# Patient Record
Sex: MALE
[De-identification: names, ages, dates, MRNs, and addresses within clinical notes are randomized per-mention and may not be internally consistent; named-entity substitution may affect disease eponyms.]

---

## 2019-09-27 ENCOUNTER — HOSPITAL ENCOUNTER (OUTPATIENT)
Dept: HOSPITAL 92 - BICRAD | Age: 34
Discharge: HOME | End: 2019-09-27
Attending: INTERNAL MEDICINE
Payer: COMMERCIAL

## 2019-09-27 DIAGNOSIS — M53.3: Primary | ICD-10-CM

## 2019-09-27 PROCEDURE — 72220 X-RAY EXAM SACRUM TAILBONE: CPT

## 2019-09-27 NOTE — RAD
3 VIEWS SACRUM AND COCCYX:

 

Date:  09/27/19 

 

HISTORY:  

Fall with coccyx pain. 

 

FINDINGS:

3 views of the sacrum/coccyx shows no evidence of displaced sacral or coccygeal fracture. The sacral 
ala are symmetric. Sacroiliac joints and pubic symphysis are unremarkable. 

 

IMPRESSION: 

Unremarkable exam. 

 

 

POS: [facility]